# Patient Record
Sex: MALE | Race: WHITE
[De-identification: names, ages, dates, MRNs, and addresses within clinical notes are randomized per-mention and may not be internally consistent; named-entity substitution may affect disease eponyms.]

---

## 2018-05-24 ENCOUNTER — HOSPITAL ENCOUNTER (INPATIENT)
Dept: HOSPITAL 91 - TEL | Age: 68
LOS: 2 days | Discharge: LEFT BEFORE DISCHARGE | DRG: 281 | End: 2018-05-26
Payer: MEDICARE

## 2018-05-24 ENCOUNTER — HOSPITAL ENCOUNTER (INPATIENT)
Age: 68
LOS: 2 days | Discharge: LEFT BEFORE DISCHARGE | DRG: 281 | End: 2018-05-26

## 2018-05-24 DIAGNOSIS — N17.9: ICD-10-CM

## 2018-05-24 DIAGNOSIS — I35.0: ICD-10-CM

## 2018-05-24 DIAGNOSIS — I95.9: ICD-10-CM

## 2018-05-24 DIAGNOSIS — I13.10: ICD-10-CM

## 2018-05-24 DIAGNOSIS — R73.9: ICD-10-CM

## 2018-05-24 DIAGNOSIS — N18.9: ICD-10-CM

## 2018-05-24 DIAGNOSIS — R55: Primary | ICD-10-CM

## 2018-05-24 DIAGNOSIS — I21.A1: ICD-10-CM

## 2018-05-24 LAB
ADD MAN DIFF?: NO
ADD UMIC: YES
ALANINE AMINOTRANSFERASE: 24 IU/L (ref 13–69)
ALBUMIN/GLOBULIN RATIO: 1.15
ALBUMIN: 4.4 G/DL (ref 3.3–4.9)
ALKALINE PHOSPHATASE: 68 IU/L (ref 42–121)
ANION GAP: 20 (ref 8–16)
ASPARTATE AMINO TRANSFERASE: 20 IU/L (ref 15–46)
B-TYPE NATRIURETIC PEPTIDE: 807 PG/ML (ref 0–125)
BASOPHIL #: 0 10^3/UL (ref 0–0.1)
BASOPHILS %: 0.4 % (ref 0–2)
BILIRUBIN,DIRECT: 0 MG/DL (ref 0–0.2)
BILIRUBIN,TOTAL: 0.6 MG/DL (ref 0.2–1.3)
BLOOD UREA NITROGEN: 26 MG/DL (ref 7–20)
CALCIUM: 9.2 MG/DL (ref 8.4–10.2)
CARBON DIOXIDE: 24 MMOL/L (ref 21–31)
CHLORIDE: 100 MMOL/L (ref 97–110)
CK INDEX: 2.6
CK-MB: 1.87 NG/ML (ref 0–2.4)
CREATINE KINASE: 73 IU/L (ref 23–200)
CREATINE KINASE: 75 IU/L (ref 23–200)
CREATININE: 1.51 MG/DL (ref 0.61–1.24)
EOSINOPHILS #: 0.2 10^3/UL (ref 0–0.5)
EOSINOPHILS %: 2.6 % (ref 0–7)
GLOBULIN: 3.8 G/DL (ref 1.3–3.2)
GLUCOSE: 255 MG/DL (ref 70–220)
HEMATOCRIT: 34.8 % (ref 42–52)
HEMOGLOBIN A1C: 5.1 % (ref 0–5.9)
HEMOGLOBIN: 11.4 G/DL (ref 14–18)
INR: 1.08
LYMPHOCYTES #: 2.8 10^3/UL (ref 0.8–2.9)
LYMPHOCYTES %: 36.6 % (ref 15–51)
MEAN CORPUSCULAR HEMOGLOBIN: 28.2 PG (ref 29–33)
MEAN CORPUSCULAR HGB CONC: 32.8 G/DL (ref 32–37)
MEAN CORPUSCULAR VOLUME: 86.1 FL (ref 82–101)
MEAN PLATELET VOLUME: 11 FL (ref 7.4–10.4)
MONOCYTE #: 0.3 10^3/UL (ref 0.3–0.9)
MONOCYTES %: 3.2 % (ref 0–11)
NEUTROPHIL #: 4.4 10^3/UL (ref 1.6–7.5)
NEUTROPHILS %: 56.9 % (ref 39–77)
NUCLEATED RED BLOOD CELLS #: 0 10^3/UL (ref 0–0)
NUCLEATED RED BLOOD CELLS%: 0 /100WBC (ref 0–0)
PLATELET COUNT: 168 10^3/UL (ref 140–415)
POTASSIUM: 3.7 MMOL/L (ref 3.5–5.1)
PROTIME: 14.1 SEC (ref 11.9–14.9)
PT RATIO: 1.1
RED BLOOD COUNT: 4.04 10^6/UL (ref 4.7–6.1)
RED CELL DISTRIBUTION WIDTH: 14.3 % (ref 11.5–14.5)
SODIUM: 140 MMOL/L (ref 135–144)
TOTAL PROTEIN: 8.2 G/DL (ref 6.1–8.1)
TROPONIN-I: 0.13 NG/ML (ref 0–0.12)
TROPONIN-I: < 0.012 NG/ML (ref 0–0.12)
UR ASCORBIC ACID: NEGATIVE MG/DL
UR BILIRUBIN (DIP): NEGATIVE MG/DL
UR BLOOD (DIP): NEGATIVE MG/DL
UR CLARITY: CLEAR
UR COLOR: (no result)
UR GLUCOSE (DIP): (no result) MG/DL
UR KETONES (DIP): NEGATIVE MG/DL
UR LEUKOCYTE ESTERASE (DIP): NEGATIVE LEU/UL
UR NITRITE (DIP): NEGATIVE MG/DL
UR PH (DIP): 8 (ref 5–9)
UR RBC: 0 /HPF (ref 0–5)
UR SPECIFIC GRAVITY (DIP): 1.01 (ref 1–1.03)
UR TOTAL PROTEIN (DIP): (no result) MG/DL
UR UROBILINOGEN (DIP): NEGATIVE MG/DL
UR WBC: 1 /HPF (ref 0–5)
WHITE BLOOD COUNT: 7.7 10^3/UL (ref 4.8–10.8)

## 2018-05-24 PROCEDURE — 84484 ASSAY OF TROPONIN QUANT: CPT

## 2018-05-24 PROCEDURE — 99285 EMERGENCY DEPT VISIT HI MDM: CPT

## 2018-05-24 PROCEDURE — 71045 X-RAY EXAM CHEST 1 VIEW: CPT

## 2018-05-24 PROCEDURE — 93306 TTE W/DOPPLER COMPLETE: CPT

## 2018-05-24 PROCEDURE — 85670 THROMBIN TIME PLASMA: CPT

## 2018-05-24 PROCEDURE — 85610 PROTHROMBIN TIME: CPT

## 2018-05-24 PROCEDURE — 93005 ELECTROCARDIOGRAM TRACING: CPT

## 2018-05-24 PROCEDURE — 80048 BASIC METABOLIC PNL TOTAL CA: CPT

## 2018-05-24 PROCEDURE — 82962 GLUCOSE BLOOD TEST: CPT

## 2018-05-24 PROCEDURE — 82553 CREATINE MB FRACTION: CPT

## 2018-05-24 PROCEDURE — 83036 HEMOGLOBIN GLYCOSYLATED A1C: CPT

## 2018-05-24 PROCEDURE — 80061 LIPID PANEL: CPT

## 2018-05-24 PROCEDURE — 36415 COLL VENOUS BLD VENIPUNCTURE: CPT

## 2018-05-24 PROCEDURE — 83735 ASSAY OF MAGNESIUM: CPT

## 2018-05-24 PROCEDURE — 85730 THROMBOPLASTIN TIME PARTIAL: CPT

## 2018-05-24 PROCEDURE — 80053 COMPREHEN METABOLIC PANEL: CPT

## 2018-05-24 PROCEDURE — 81001 URINALYSIS AUTO W/SCOPE: CPT

## 2018-05-24 PROCEDURE — 85025 COMPLETE CBC W/AUTO DIFF WBC: CPT

## 2018-05-24 PROCEDURE — 96374 THER/PROPH/DIAG INJ IV PUSH: CPT

## 2018-05-24 PROCEDURE — 82550 ASSAY OF CK (CPK): CPT

## 2018-05-24 PROCEDURE — 83880 ASSAY OF NATRIURETIC PEPTIDE: CPT

## 2018-05-24 PROCEDURE — 85049 AUTOMATED PLATELET COUNT: CPT

## 2018-05-24 RX ADMIN — THIAMINE HYDROCHLORIDE 1 MLS/HR: 100 INJECTION, SOLUTION INTRAMUSCULAR; INTRAVENOUS at 11:03

## 2018-05-24 RX ADMIN — INSULIN ASPART 1 UNIT: 100 INJECTION, SOLUTION INTRAVENOUS; SUBCUTANEOUS at 16:56

## 2018-05-24 RX ADMIN — INSULIN ASPART 1 UNIT: 100 INJECTION, SOLUTION INTRAVENOUS; SUBCUTANEOUS at 21:00

## 2018-05-24 RX ADMIN — ONDANSETRON HYDROCHLORIDE 1 MG: 2 INJECTION, SOLUTION INTRAMUSCULAR; INTRAVENOUS at 11:03

## 2018-05-24 RX ADMIN — THIAMINE HYDROCHLORIDE 1 MLS/HR: 100 INJECTION, SOLUTION INTRAMUSCULAR; INTRAVENOUS at 15:19

## 2018-05-24 RX ADMIN — ATORVASTATIN CALCIUM 1 MG: 20 TABLET, FILM COATED ORAL at 21:03

## 2018-05-25 LAB
ADD MAN DIFF?: NO
ADD MAN DIFF?: NO
ALANINE AMINOTRANSFERASE: 26 IU/L (ref 13–69)
ALBUMIN/GLOBULIN RATIO: 1.09
ALBUMIN: 3.6 G/DL (ref 3.3–4.9)
ALKALINE PHOSPHATASE: 53 IU/L (ref 42–121)
ANION GAP: 14 (ref 8–16)
ASPARTATE AMINO TRANSFERASE: 21 IU/L (ref 15–46)
BASOPHIL #: 0 10^3/UL (ref 0–0.1)
BASOPHIL #: 0 10^3/UL (ref 0–0.1)
BASOPHILS %: 0.4 % (ref 0–2)
BASOPHILS %: 0.5 % (ref 0–2)
BILIRUBIN,DIRECT: 0 MG/DL (ref 0–0.2)
BILIRUBIN,TOTAL: 0.3 MG/DL (ref 0.2–1.3)
BLOOD UREA NITROGEN: 22 MG/DL (ref 7–20)
CALCIUM: 8.9 MG/DL (ref 8.4–10.2)
CARBON DIOXIDE: 29 MMOL/L (ref 21–31)
CHLORIDE: 106 MMOL/L (ref 97–110)
CK INDEX: 2.7
CK-MB: 2.03 NG/ML (ref 0–2.4)
CREATININE: 1.3 MG/DL (ref 0.61–1.24)
EOSINOPHILS #: 0.2 10^3/UL (ref 0–0.5)
EOSINOPHILS #: 0.3 10^3/UL (ref 0–0.5)
EOSINOPHILS %: 2.9 % (ref 0–7)
EOSINOPHILS %: 3.5 % (ref 0–7)
GLOBULIN: 3.3 G/DL (ref 1.3–3.2)
GLUCOSE: 102 MG/DL (ref 70–220)
HEMATOCRIT: 32.4 % (ref 42–52)
HEMATOCRIT: 34.6 % (ref 42–52)
HEMOGLOBIN: 10.4 G/DL (ref 14–18)
HEMOGLOBIN: 11.1 G/DL (ref 14–18)
INR: 0.99
LYMPHOCYTES #: 2.4 10^3/UL (ref 0.8–2.9)
LYMPHOCYTES #: 2.6 10^3/UL (ref 0.8–2.9)
LYMPHOCYTES %: 31.9 % (ref 15–51)
LYMPHOCYTES %: 33 % (ref 15–51)
MAGNESIUM: 2.2 MG/DL (ref 1.7–2.5)
MEAN CORPUSCULAR HEMOGLOBIN: 28 PG (ref 29–33)
MEAN CORPUSCULAR HEMOGLOBIN: 28 PG (ref 29–33)
MEAN CORPUSCULAR HGB CONC: 32.1 G/DL (ref 32–37)
MEAN CORPUSCULAR HGB CONC: 32.1 G/DL (ref 32–37)
MEAN CORPUSCULAR VOLUME: 87.3 FL (ref 82–101)
MEAN CORPUSCULAR VOLUME: 87.4 FL (ref 82–101)
MEAN PLATELET VOLUME: 10.4 FL (ref 7.4–10.4)
MEAN PLATELET VOLUME: 10.9 FL (ref 7.4–10.4)
MONOCYTE #: 0.3 10^3/UL (ref 0.3–0.9)
MONOCYTE #: 0.4 10^3/UL (ref 0.3–0.9)
MONOCYTES %: 3.9 % (ref 0–11)
MONOCYTES %: 4.9 % (ref 0–11)
NEUTROPHIL #: 4.6 10^3/UL (ref 1.6–7.5)
NEUTROPHIL #: 4.6 10^3/UL (ref 1.6–7.5)
NEUTROPHILS %: 57.8 % (ref 39–77)
NEUTROPHILS %: 60.8 % (ref 39–77)
NUCLEATED RED BLOOD CELLS #: 0 10^3/UL (ref 0–0)
NUCLEATED RED BLOOD CELLS #: 0 10^3/UL (ref 0–0)
NUCLEATED RED BLOOD CELLS%: 0 /100WBC (ref 0–0)
NUCLEATED RED BLOOD CELLS%: 0 /100WBC (ref 0–0)
PARTIAL THROMBOPLASTIN TIME: 42.1 SEC (ref 25–35)
PARTIAL THROMBOPLASTIN TIME: 44.5 SEC (ref 25–35)
PLATELET COUNT: 178 10^3/UL (ref 140–415)
PLATELET COUNT: 182 10^3/UL (ref 140–415)
POTASSIUM: 3.6 MMOL/L (ref 3.5–5.1)
PROTIME: 13.2 SEC (ref 11.9–14.9)
PT RATIO: 1
RED BLOOD COUNT: 3.71 10^6/UL (ref 4.7–6.1)
RED BLOOD COUNT: 3.96 10^6/UL (ref 4.7–6.1)
RED CELL DISTRIBUTION WIDTH: 14.5 % (ref 11.5–14.5)
RED CELL DISTRIBUTION WIDTH: 14.6 % (ref 11.5–14.5)
SODIUM: 145 MMOL/L (ref 135–144)
TOTAL PROTEIN: 6.9 G/DL (ref 6.1–8.1)
TROPONIN-I: 0.16 NG/ML (ref 0–0.12)
TROPONIN-I: 0.21 NG/ML (ref 0–0.12)
TROPONIN-I: 0.29 NG/ML (ref 0–0.12)
WHITE BLOOD COUNT: 7.6 10^3/UL (ref 4.8–10.8)
WHITE BLOOD COUNT: 8 10^3/UL (ref 4.8–10.8)

## 2018-05-25 RX ADMIN — HEPARIN SODIUM AND DEXTROSE 1 MLS/HR: 10000; 5 INJECTION INTRAVENOUS at 17:26

## 2018-05-25 RX ADMIN — ATORVASTATIN CALCIUM 1 MG: 20 TABLET, FILM COATED ORAL at 21:21

## 2018-05-25 RX ADMIN — INSULIN ASPART 1 UNIT: 100 INJECTION, SOLUTION INTRAVENOUS; SUBCUTANEOUS at 21:00

## 2018-05-25 RX ADMIN — THIAMINE HYDROCHLORIDE 1 MLS/HR: 100 INJECTION, SOLUTION INTRAMUSCULAR; INTRAVENOUS at 11:00

## 2018-05-25 RX ADMIN — INSULIN ASPART 1 UNIT: 100 INJECTION, SOLUTION INTRAVENOUS; SUBCUTANEOUS at 07:35

## 2018-05-25 RX ADMIN — VALSARTAN 1 MG: 160 TABLET, FILM COATED ORAL at 09:38

## 2018-05-25 RX ADMIN — INSULIN ASPART 1 UNIT: 100 INJECTION, SOLUTION INTRAVENOUS; SUBCUTANEOUS at 17:00

## 2018-05-25 RX ADMIN — METOPROLOL TARTRATE 1 MG: 25 TABLET ORAL at 21:22

## 2018-05-25 RX ADMIN — HEPARIN SODIUM 1 UNIT: 1000 INJECTION, SOLUTION INTRAVENOUS; SUBCUTANEOUS at 09:41

## 2018-05-25 RX ADMIN — INSULIN ASPART 1 UNIT: 100 INJECTION, SOLUTION INTRAVENOUS; SUBCUTANEOUS at 11:45

## 2018-05-25 RX ADMIN — ASPIRIN 325 MG ORAL TABLET 1 MG: 325 PILL ORAL at 09:38

## 2018-05-25 RX ADMIN — HEPARIN SODIUM 1 UNIT: 1000 INJECTION, SOLUTION INTRAVENOUS; SUBCUTANEOUS at 17:24

## 2018-05-25 RX ADMIN — HEPARIN SODIUM AND DEXTROSE 1 MLS/HR: 10000; 5 INJECTION INTRAVENOUS at 10:53

## 2018-05-25 RX ADMIN — AMLODIPINE BESYLATE 1 MG: 10 TABLET ORAL at 09:39

## 2018-05-25 RX ADMIN — METOPROLOL SUCCINATE 1 MG: 25 TABLET, EXTENDED RELEASE ORAL at 09:39

## 2018-05-26 LAB
ADD MAN DIFF?: NO
ANION GAP: 14 (ref 8–16)
BASOPHIL #: 0 10^3/UL (ref 0–0.1)
BASOPHILS %: 0.5 % (ref 0–2)
BLOOD UREA NITROGEN: 20 MG/DL (ref 7–20)
CALCIUM: 8.7 MG/DL (ref 8.4–10.2)
CARBON DIOXIDE: 27 MMOL/L (ref 21–31)
CHLORIDE: 107 MMOL/L (ref 97–110)
CHOL/HDL RATIO: 7.3 RATIO
CHOLESTEROL: 184 MG/DL (ref 100–200)
CREATININE: 1.23 MG/DL (ref 0.61–1.24)
EOSINOPHILS #: 0.2 10^3/UL (ref 0–0.5)
EOSINOPHILS %: 2.9 % (ref 0–7)
GLUCOSE: 106 MG/DL (ref 70–220)
HDL CHOLESTEROL: 25 MG/DL (ref 30–78)
HEMATOCRIT: 35.3 % (ref 42–52)
HEMOGLOBIN: 11.4 G/DL (ref 14–18)
INR: 1.03
LDL CHOLESTEROL,CALCULATED: 111 MG/DL
LYMPHOCYTES #: 2.3 10^3/UL (ref 0.8–2.9)
LYMPHOCYTES %: 28.9 % (ref 15–51)
MEAN CORPUSCULAR HEMOGLOBIN: 28.4 PG (ref 29–33)
MEAN CORPUSCULAR HGB CONC: 32.3 G/DL (ref 32–37)
MEAN CORPUSCULAR VOLUME: 87.8 FL (ref 82–101)
MEAN PLATELET VOLUME: 10.3 FL (ref 7.4–10.4)
MONOCYTE #: 0.5 10^3/UL (ref 0.3–0.9)
MONOCYTES %: 5.9 % (ref 0–11)
NEUTROPHIL #: 4.9 10^3/UL (ref 1.6–7.5)
NEUTROPHILS %: 61.5 % (ref 39–77)
NUCLEATED RED BLOOD CELLS #: 0 10^3/UL (ref 0–0)
NUCLEATED RED BLOOD CELLS%: 0 /100WBC (ref 0–0)
PARTIAL THROMBOPLASTIN TIME: 49.3 SEC (ref 25–35)
PARTIAL THROMBOPLASTIN TIME: 67 SEC (ref 25–35)
PARTIAL THROMBOPLASTIN TIME: 96 SEC (ref 25–35)
PLATELET COUNT: 185 10^3/UL (ref 140–415)
PLATELET COUNT: 205 10^3/UL (ref 140–415)
POTASSIUM: 3.8 MMOL/L (ref 3.5–5.1)
PROTIME: 13.6 SEC (ref 11.9–14.9)
PT RATIO: 1.1
RED BLOOD COUNT: 4.02 10^6/UL (ref 4.7–6.1)
RED CELL DISTRIBUTION WIDTH: 14.1 % (ref 11.5–14.5)
SODIUM: 144 MMOL/L (ref 135–144)
THROMBIN TIME: 20.4 SEC (ref 13.8–19.1)
TRIGLYCERIDES: 241 MG/DL (ref 0–149)
WHITE BLOOD COUNT: 8 10^3/UL (ref 4.8–10.8)

## 2018-05-26 RX ADMIN — INSULIN ASPART 1 UNIT: 100 INJECTION, SOLUTION INTRAVENOUS; SUBCUTANEOUS at 21:00

## 2018-05-26 RX ADMIN — INSULIN ASPART 1 UNIT: 100 INJECTION, SOLUTION INTRAVENOUS; SUBCUTANEOUS at 11:50

## 2018-05-26 RX ADMIN — HYDRALAZINE HYDROCHLORIDE 1 MG: 20 INJECTION INTRAMUSCULAR; INTRAVENOUS at 00:24

## 2018-05-26 RX ADMIN — HEPARIN SODIUM AND DEXTROSE 1 MLS/HR: 10000; 5 INJECTION INTRAVENOUS at 08:25

## 2018-05-26 RX ADMIN — INSULIN ASPART 1 UNIT: 100 INJECTION, SOLUTION INTRAVENOUS; SUBCUTANEOUS at 17:17

## 2018-05-26 RX ADMIN — HEPARIN SODIUM AND DEXTROSE 1 MLS/HR: 10000; 5 INJECTION INTRAVENOUS at 12:52

## 2018-05-26 RX ADMIN — INSULIN ASPART 1 UNIT: 100 INJECTION, SOLUTION INTRAVENOUS; SUBCUTANEOUS at 07:55

## 2018-05-26 RX ADMIN — METOPROLOL TARTRATE 1 MG: 25 TABLET ORAL at 21:06

## 2018-05-26 RX ADMIN — ATORVASTATIN CALCIUM 1 MG: 20 TABLET, FILM COATED ORAL at 21:05

## 2018-05-26 RX ADMIN — HEPARIN SODIUM AND DEXTROSE 1 MLS/HR: 10000; 5 INJECTION INTRAVENOUS at 00:41

## 2018-05-26 RX ADMIN — METOPROLOL TARTRATE 1 MG: 25 TABLET ORAL at 08:15

## 2018-05-26 RX ADMIN — ASPIRIN 325 MG ORAL TABLET 1 MG: 325 PILL ORAL at 08:14

## 2018-05-26 RX ADMIN — AMLODIPINE BESYLATE 1 MG: 10 TABLET ORAL at 08:14

## 2019-12-31 ENCOUNTER — HOSPITAL ENCOUNTER (INPATIENT)
Dept: HOSPITAL 54 - ER | Age: 69
LOS: 4 days | Discharge: HOME HEALTH | DRG: 871 | End: 2020-01-04
Attending: INTERNAL MEDICINE | Admitting: INTERNAL MEDICINE
Payer: MEDICARE

## 2019-12-31 VITALS — WEIGHT: 187 LBS | BODY MASS INDEX: 27.7 KG/M2 | HEIGHT: 69 IN

## 2019-12-31 VITALS — SYSTOLIC BLOOD PRESSURE: 109 MMHG | DIASTOLIC BLOOD PRESSURE: 56 MMHG

## 2019-12-31 VITALS — SYSTOLIC BLOOD PRESSURE: 119 MMHG | DIASTOLIC BLOOD PRESSURE: 68 MMHG

## 2019-12-31 DIAGNOSIS — L03.116: ICD-10-CM

## 2019-12-31 DIAGNOSIS — D61.818: ICD-10-CM

## 2019-12-31 DIAGNOSIS — E43: ICD-10-CM

## 2019-12-31 DIAGNOSIS — I25.10: ICD-10-CM

## 2019-12-31 DIAGNOSIS — A41.9: Primary | ICD-10-CM

## 2019-12-31 DIAGNOSIS — I21.A1: ICD-10-CM

## 2019-12-31 DIAGNOSIS — D69.59: ICD-10-CM

## 2019-12-31 DIAGNOSIS — E87.1: ICD-10-CM

## 2019-12-31 DIAGNOSIS — Z82.49: ICD-10-CM

## 2019-12-31 DIAGNOSIS — E80.6: ICD-10-CM

## 2019-12-31 DIAGNOSIS — Z79.82: ICD-10-CM

## 2019-12-31 DIAGNOSIS — E86.1: ICD-10-CM

## 2019-12-31 DIAGNOSIS — E87.6: ICD-10-CM

## 2019-12-31 DIAGNOSIS — K76.0: ICD-10-CM

## 2019-12-31 DIAGNOSIS — Z95.2: ICD-10-CM

## 2019-12-31 DIAGNOSIS — R16.2: ICD-10-CM

## 2019-12-31 DIAGNOSIS — I10: ICD-10-CM

## 2019-12-31 DIAGNOSIS — R65.20: ICD-10-CM

## 2019-12-31 DIAGNOSIS — K52.9: ICD-10-CM

## 2019-12-31 DIAGNOSIS — Z95.5: ICD-10-CM

## 2019-12-31 DIAGNOSIS — B95.61: ICD-10-CM

## 2019-12-31 DIAGNOSIS — K44.9: ICD-10-CM

## 2019-12-31 DIAGNOSIS — N17.0: ICD-10-CM

## 2019-12-31 DIAGNOSIS — E88.09: ICD-10-CM

## 2019-12-31 DIAGNOSIS — J18.9: ICD-10-CM

## 2019-12-31 LAB
ALBUMIN SERPL BCP-MCNC: 2.6 G/DL (ref 3.4–5)
ALP SERPL-CCNC: 86 U/L (ref 46–116)
ALT SERPL W P-5'-P-CCNC: 62 U/L (ref 12–78)
APPEARANCE UR: (no result)
AST SERPL W P-5'-P-CCNC: 61 U/L (ref 15–37)
BASOPHILS # BLD AUTO: 0 /CMM (ref 0–0.2)
BASOPHILS NFR BLD AUTO: 1.3 % (ref 0–2)
BILIRUB DIRECT SERPL-MCNC: 1.2 MG/DL (ref 0–0.2)
BILIRUB SERPL-MCNC: 1.8 MG/DL (ref 0.2–1)
BILIRUB UR QL STRIP: (no result)
BUN SERPL-MCNC: 45 MG/DL (ref 7–18)
CALCIUM SERPL-MCNC: 8.5 MG/DL (ref 8.5–10.1)
CHLORIDE SERPL-SCNC: 94 MMOL/L (ref 98–107)
CO2 SERPL-SCNC: 24 MMOL/L (ref 21–32)
COLOR UR: (no result)
CREAT SERPL-MCNC: 2.4 MG/DL (ref 0.6–1.3)
EOSINOPHIL NFR BLD AUTO: 0.2 % (ref 0–6)
GLUCOSE SERPL-MCNC: 122 MG/DL (ref 74–106)
HCT VFR BLD AUTO: 35 % (ref 39–51)
HGB BLD-MCNC: 11.7 G/DL (ref 13.5–17.5)
LYMPHOCYTES NFR BLD AUTO: 0.6 /CMM (ref 0.8–4.8)
LYMPHOCYTES NFR BLD AUTO: 19.1 % (ref 20–44)
LYMPHOCYTES NFR BLD MANUAL: 15 % (ref 16–48)
MCHC RBC AUTO-ENTMCNC: 33 G/DL (ref 31–36)
MCV RBC AUTO: 84 FL (ref 80–96)
MONOCYTES NFR BLD AUTO: 0.2 /CMM (ref 0.1–1.3)
MONOCYTES NFR BLD AUTO: 5.3 % (ref 2–12)
MONOCYTES NFR BLD MANUAL: 5 % (ref 0–11)
NEUTROPHILS # BLD AUTO: 2.4 /CMM (ref 1.8–8.9)
NEUTROPHILS NFR BLD AUTO: 74.1 % (ref 43–81)
NEUTS SEG NFR BLD MANUAL: 80 % (ref 42–76)
PH UR STRIP: 5.5 [PH] (ref 5–8)
PLATELET # BLD AUTO: 56 /CMM (ref 150–450)
POTASSIUM SERPL-SCNC: 3.4 MMOL/L (ref 3.5–5.1)
PROT SERPL-MCNC: 6.8 G/DL (ref 6.4–8.2)
PROT UR QL STRIP: >=300 MG/DL
RBC # BLD AUTO: 4.17 MIL/UL (ref 4.5–6)
RBC #/AREA URNS HPF: (no result) /HPF (ref 0–2)
SODIUM SERPL-SCNC: 131 MMOL/L (ref 136–145)
UROBILINOGEN UR STRIP-MCNC: 1 EU/DL
WBC #/AREA URNS HPF: (no result) /HPF (ref 0–3)
WBC NRBC COR # BLD AUTO: 3.2 K/UL (ref 4.3–11)

## 2019-12-31 PROCEDURE — G0378 HOSPITAL OBSERVATION PER HR: HCPCS

## 2019-12-31 PROCEDURE — C1751 CATH, INF, PER/CENT/MIDLINE: HCPCS

## 2019-12-31 RX ADMIN — Medication SCH MG: at 16:50

## 2019-12-31 RX ADMIN — SODIUM CHLORIDE SCH MLS/HR: 9 INJECTION, SOLUTION INTRAVENOUS at 16:49

## 2019-12-31 RX ADMIN — ACETAMINOPHEN PRN MG: 325 TABLET ORAL at 23:57

## 2019-12-31 RX ADMIN — SIMVASTATIN SCH MG: 40 TABLET, FILM COATED ORAL at 22:56

## 2019-12-31 NOTE — NUR
TELE/RN OPENING NOTES

RECEIVED PATIENT SITTING IN BED, FAMILY AT BED SIDE ON OXYGEN FOR COMFORT MEASURES, ABLE TO 
AMBULATE WITH ASSISTANCE, RECEIVED ENDORSEMENT FROM AM RN FOR KERI. BED LOCKED, CALL LIGHTS 
WITHIN REACH. TELE MONITOR AWITH SR TO ST . SKIN WARM TO TOUCH, REFUSE SKIN ASSESSMENT 
REPORTED INTASCT WITH NO OPEN WOUNDS, MONITORING FOR ELEVATED TEMPERATURE, WILL MONITOR. ON 
CARDIAC DIET, ABLE TO TOLERATE FLUIDS. IV SITE ON RIGHT AC PATENT. WILL MONITOR.

## 2019-12-31 NOTE — NUR
TELE/RN NOTES VANCOMYCIN 1.25MG IV ADMINISTERED VIA IV PER MD ORDER. PATIENT TOLERATED , IV 
SITE ON RIGHT AC PATENT.

## 2019-12-31 NOTE — NUR
MS RN NOTE:



INFORMED PATIENT ABOUT SKIN ASSESSMENT TO BE DONE AND EXPLAINED THE NEED.  PATIENT AND 
FAMILY INFORMED NURSE THAT PATIENT IS FREE OF SKIN ISSUES AND SKIN IS INTACT EXCEPT FOR THE 
LEFT SHIN ABRASION. REFUSED HEAD TO TOE SKIN ASSESSMENT.

## 2019-12-31 NOTE — NUR
TELE RN ADMISSION NOTE:



PATIENT ARRIVED IN UNIT VIA GURNEY WITH ED STAFF AND FAMILY. PATIENT IS INDEPENDENT WITH 
AMBULATION. ON ROOM AIR AND TOLERATING WELL, NO SOB AND NOT IN RESPIRATORY DISTRESS. 
SATURATION OF 95%, OFFERED O2 VIA NC @ 2LPM AND PATIENT ACCEPTED. TOLERATED WELL WITH 
SATURATION @ 99%. ALERT AND ORIENTED X4. Turkish-SPEAKING AND UNDERSTANDS ENGLISH FAIRLY 
WELL WITH SONS ACTING AS . IV SITE ON RAC G20 SALINE LOCK, CLEAN, DRY AND PATENT. 
NO PAIN NO DISCOMFORT NOTED. ON CARDIAC MONITORING WITH SINUS TACHYCARDIA @ 101BPM. FEVER 
NOTED .3. COOLING MEASURES STARTED. ORIENTED TO ROOM SET-UP. CALL LIGHT IN REACH, SIDE 
RAILS UP, BED LOCKED, LOW AND AT SEMI-EAGLE'S POSITION. WILL CONTINUE TO MONITOR. AWAITING 
ORDERS FROM DR. VIRK.

## 2019-12-31 NOTE — NUR
TELE RN CLOSING NOTE:



PATIENT IN BED. AWAKE, ALERT AND ORIENTED X4. ON O2 VIA NC @ 2LPM AND PATIENT TOLERATING 
WELL WITH SATURATION @ 99%.  IV SITE ON RAC G20 SALINE LOCK, CLEAN, DRY AND PATENT WITH IV 
INFUSION OF NS @ 100MLS/HR INFUSING WELL. NO PAIN NO DISCOMFORT NOTED. ON CARDIAC MONITORING 
WITH SINUS TACHYCARDIA @ 105BPM. FEVER NOTED UPON ADMISSION AND MD AWARE, COOLING MEASURES 
IN PLACE. CALL LIGHT IN REACH, SIDE RAILS UP, BED LOCKED, LOW AND AT SEMI-EAGLE'S POSITION. 
DR. VIRK MADE ROUNDS WITH PATIENT EARLIER AND IS AWARE OF PATIENT'S CONDITION. 
ENDORSED CARE TO NIGHT SHIFT.

## 2020-01-01 VITALS — DIASTOLIC BLOOD PRESSURE: 61 MMHG | SYSTOLIC BLOOD PRESSURE: 121 MMHG

## 2020-01-01 VITALS — DIASTOLIC BLOOD PRESSURE: 69 MMHG | SYSTOLIC BLOOD PRESSURE: 132 MMHG

## 2020-01-01 VITALS — SYSTOLIC BLOOD PRESSURE: 125 MMHG | DIASTOLIC BLOOD PRESSURE: 64 MMHG

## 2020-01-01 VITALS — DIASTOLIC BLOOD PRESSURE: 54 MMHG | SYSTOLIC BLOOD PRESSURE: 106 MMHG

## 2020-01-01 VITALS — SYSTOLIC BLOOD PRESSURE: 118 MMHG | DIASTOLIC BLOOD PRESSURE: 57 MMHG

## 2020-01-01 VITALS — DIASTOLIC BLOOD PRESSURE: 66 MMHG | SYSTOLIC BLOOD PRESSURE: 129 MMHG

## 2020-01-01 LAB
APPEARANCE UR: CLEAR
BASOPHILS # BLD AUTO: 0 /CMM (ref 0–0.2)
BASOPHILS NFR BLD AUTO: 0.8 % (ref 0–2)
BILIRUB UR QL STRIP: NEGATIVE
BUN SERPL-MCNC: 45 MG/DL (ref 7–18)
CALCIUM SERPL-MCNC: 7.6 MG/DL (ref 8.5–10.1)
CHLORIDE SERPL-SCNC: 98 MMOL/L (ref 98–107)
CHOLEST SERPL-MCNC: 171 MG/DL (ref ?–200)
CO2 SERPL-SCNC: 24 MMOL/L (ref 21–32)
COLOR UR: (no result)
CREAT SERPL-MCNC: 2.2 MG/DL (ref 0.6–1.3)
CREAT UR-MCNC: 118 MG/DL (ref 30–125)
EOSINOPHIL NFR BLD AUTO: 0.6 % (ref 0–6)
GLUCOSE SERPL-MCNC: 123 MG/DL (ref 74–106)
GLUCOSE UR STRIP-MCNC: NEGATIVE MG/DL
HCT VFR BLD AUTO: 32 % (ref 39–51)
HDLC SERPL-MCNC: 10 MG/DL (ref 40–60)
HGB BLD-MCNC: 10.4 G/DL (ref 13.5–17.5)
HGB UR QL STRIP: (no result) ERY/UL
KETONES UR STRIP-MCNC: NEGATIVE MG/DL
LDLC SERPL DIRECT ASSAY-MCNC: 23 MG/DL (ref 0–99)
LEUKOCYTE ESTERASE UR QL STRIP: NEGATIVE
LYMPHOCYTES NFR BLD AUTO: 0.9 /CMM (ref 0.8–4.8)
LYMPHOCYTES NFR BLD AUTO: 26 % (ref 20–44)
LYMPHOCYTES NFR BLD MANUAL: 18 % (ref 16–48)
MAGNESIUM SERPL-MCNC: 2.3 MG/DL (ref 1.8–2.4)
MCHC RBC AUTO-ENTMCNC: 33 G/DL (ref 31–36)
MCV RBC AUTO: 84 FL (ref 80–96)
MONOCYTES NFR BLD AUTO: 0.4 /CMM (ref 0.1–1.3)
MONOCYTES NFR BLD AUTO: 12.3 % (ref 2–12)
MONOCYTES NFR BLD MANUAL: 10 % (ref 0–11)
NEUTROPHILS # BLD AUTO: 2.2 /CMM (ref 1.8–8.9)
NEUTROPHILS NFR BLD AUTO: 60.3 % (ref 43–81)
NEUTS BAND NFR BLD MANUAL: 8 % (ref 0–5)
NEUTS SEG NFR BLD MANUAL: 64 % (ref 42–76)
NITRITE UR QL STRIP: NEGATIVE
PH UR STRIP: 6 [PH] (ref 5–8)
PHOSPHATE SERPL-MCNC: 2.9 MG/DL (ref 2.5–4.9)
PLATELET # BLD AUTO: 36 /CMM (ref 150–450)
POTASSIUM SERPL-SCNC: 3.2 MMOL/L (ref 3.5–5.1)
PROT UR QL STRIP: 30 MG/DL
PROT UR-MCNC: 86.5 MG/DL (ref 0–11.9)
RBC # BLD AUTO: 3.77 MIL/UL (ref 4.5–6)
RBC #/AREA URNS HPF: (no result) /HPF (ref 0–2)
SODIUM SERPL-SCNC: 133 MMOL/L (ref 136–145)
SODIUM UR-SCNC: 7 MMOL/L (ref 40–220)
TRIGL SERPL-MCNC: 853 MG/DL (ref 30–150)
UROBILINOGEN UR STRIP-MCNC: 0.2 EU/DL
WBC #/AREA URNS HPF: (no result) /HPF (ref 0–3)
WBC NRBC COR # BLD AUTO: 3.6 K/UL (ref 4.3–11)

## 2020-01-01 RX ADMIN — SODIUM CHLORIDE SCH MLS/HR: 9 INJECTION, SOLUTION INTRAVENOUS at 03:21

## 2020-01-01 RX ADMIN — POTASSIUM CHLORIDE SCH MLS/HR: 200 INJECTION, SOLUTION INTRAVENOUS at 09:47

## 2020-01-01 RX ADMIN — ACETAMINOPHEN PRN MG: 325 TABLET ORAL at 11:03

## 2020-01-01 RX ADMIN — ACETAMINOPHEN PRN MG: 325 TABLET ORAL at 21:52

## 2020-01-01 RX ADMIN — Medication SCH MG: at 08:36

## 2020-01-01 RX ADMIN — SODIUM CHLORIDE PRN MLS/HR: 9 INJECTION, SOLUTION INTRAVENOUS at 09:50

## 2020-01-01 RX ADMIN — MEROPENEM SCH MLS/HR: 1 INJECTION INTRAVENOUS at 09:03

## 2020-01-01 RX ADMIN — DEXTROSE MONOHYDRATE PRN MG: 50 INJECTION, SOLUTION INTRAVENOUS at 19:26

## 2020-01-01 RX ADMIN — Medication SCH MG: at 16:38

## 2020-01-01 RX ADMIN — SODIUM CHLORIDE PRN MLS/HR: 9 INJECTION, SOLUTION INTRAVENOUS at 17:59

## 2020-01-01 RX ADMIN — POTASSIUM CHLORIDE SCH MLS/HR: 200 INJECTION, SOLUTION INTRAVENOUS at 10:56

## 2020-01-01 RX ADMIN — OSELTAMIVIR PHOSPHATE SCH MG: 75 CAPSULE ORAL at 17:59

## 2020-01-01 RX ADMIN — METOPROLOL SUCCINATE SCH MG: 50 TABLET, EXTENDED RELEASE ORAL at 08:37

## 2020-01-01 RX ADMIN — POTASSIUM CHLORIDE SCH MLS/HR: 200 INJECTION, SOLUTION INTRAVENOUS at 08:36

## 2020-01-01 RX ADMIN — DEXTROSE MONOHYDRATE SCH MLS/HR: 50 INJECTION, SOLUTION INTRAVENOUS at 13:27

## 2020-01-01 RX ADMIN — POTASSIUM CHLORIDE SCH MLS/HR: 200 INJECTION, SOLUTION INTRAVENOUS at 12:08

## 2020-01-01 RX ADMIN — FAMOTIDINE SCH MG: 20 TABLET, FILM COATED ORAL at 08:36

## 2020-01-01 RX ADMIN — MEROPENEM SCH MLS/HR: 1 INJECTION INTRAVENOUS at 21:51

## 2020-01-01 RX ADMIN — SIMVASTATIN SCH MG: 40 TABLET, FILM COATED ORAL at 22:43

## 2020-01-01 NOTE — NUR
TELE/RN CLOSING NOTES

PATIENT IN BED, ABLE TO REPOSITION SELF WITH ASSISTANCE, KEPT COMFORTABLE, ATTENDED TO ALL 
NEEDS, IV SITE PATENT WITH NO S/S OF INFILTRATION, MONITORED FOR ANY CHANGES , KEPT SKIN 
INTACT. RESPIRATIONS EVEN AND UNLABORED WITH 2 LITER OXYGEN. BED LOCKED, CALL LIGHTS WITHIN 
REACH, WILL MONITOR. WILL ENDORSE TO AM RN FOR KERI.

## 2020-01-02 VITALS — DIASTOLIC BLOOD PRESSURE: 72 MMHG | SYSTOLIC BLOOD PRESSURE: 124 MMHG

## 2020-01-02 VITALS — SYSTOLIC BLOOD PRESSURE: 124 MMHG | DIASTOLIC BLOOD PRESSURE: 69 MMHG

## 2020-01-02 VITALS — DIASTOLIC BLOOD PRESSURE: 73 MMHG | SYSTOLIC BLOOD PRESSURE: 146 MMHG

## 2020-01-02 VITALS — SYSTOLIC BLOOD PRESSURE: 117 MMHG | DIASTOLIC BLOOD PRESSURE: 64 MMHG

## 2020-01-02 LAB
ALBUMIN SERPL BCP-MCNC: 2 G/DL (ref 3.4–5)
ALP SERPL-CCNC: 89 U/L (ref 46–116)
ALT SERPL W P-5'-P-CCNC: 100 U/L (ref 12–78)
AST SERPL W P-5'-P-CCNC: 98 U/L (ref 15–37)
BASOPHILS # BLD AUTO: 0 /CMM (ref 0–0.2)
BASOPHILS NFR BLD AUTO: 0.6 % (ref 0–2)
BILIRUB SERPL-MCNC: 1 MG/DL (ref 0.2–1)
BUN SERPL-MCNC: 30 MG/DL (ref 7–18)
BUN SERPL-MCNC: 38 MG/DL (ref 7–18)
CALCIUM SERPL-MCNC: 7.4 MG/DL (ref 8.5–10.1)
CALCIUM SERPL-MCNC: 7.8 MG/DL (ref 8.5–10.1)
CHLORIDE SERPL-SCNC: 101 MMOL/L (ref 98–107)
CHLORIDE SERPL-SCNC: 102 MMOL/L (ref 98–107)
CK SERPL-CCNC: 125 U/L (ref 39–308)
CO2 SERPL-SCNC: 23 MMOL/L (ref 21–32)
CO2 SERPL-SCNC: 24 MMOL/L (ref 21–32)
CREAT SERPL-MCNC: 1.4 MG/DL (ref 0.6–1.3)
CREAT SERPL-MCNC: 1.6 MG/DL (ref 0.6–1.3)
EOSINOPHIL NFR BLD AUTO: 0.8 % (ref 0–6)
GLUCOSE SERPL-MCNC: 145 MG/DL (ref 74–106)
GLUCOSE SERPL-MCNC: 161 MG/DL (ref 74–106)
HCT VFR BLD AUTO: 31 % (ref 39–51)
HGB BLD-MCNC: 10.5 G/DL (ref 13.5–17.5)
LYMPHOCYTES NFR BLD AUTO: 1.3 /CMM (ref 0.8–4.8)
LYMPHOCYTES NFR BLD AUTO: 25.6 % (ref 20–44)
MAGNESIUM SERPL-MCNC: 2.3 MG/DL (ref 1.8–2.4)
MCHC RBC AUTO-ENTMCNC: 33 G/DL (ref 31–36)
MCV RBC AUTO: 84 FL (ref 80–96)
MONOCYTES NFR BLD AUTO: 0.6 /CMM (ref 0.1–1.3)
MONOCYTES NFR BLD AUTO: 11.2 % (ref 2–12)
NEUTROPHILS # BLD AUTO: 3.2 /CMM (ref 1.8–8.9)
NEUTROPHILS NFR BLD AUTO: 61.8 % (ref 43–81)
PHOSPHATE SERPL-MCNC: 2.1 MG/DL (ref 2.5–4.9)
PLATELET # BLD AUTO: 52 /CMM (ref 150–450)
POTASSIUM SERPL-SCNC: 3.1 MMOL/L (ref 3.5–5.1)
POTASSIUM SERPL-SCNC: 3.4 MMOL/L (ref 3.5–5.1)
PROT SERPL-MCNC: 5.8 G/DL (ref 6.4–8.2)
RBC # BLD AUTO: 3.73 MIL/UL (ref 4.5–6)
SODIUM SERPL-SCNC: 134 MMOL/L (ref 136–145)
SODIUM SERPL-SCNC: 136 MMOL/L (ref 136–145)
WBC NRBC COR # BLD AUTO: 5.2 K/UL (ref 4.3–11)

## 2020-01-02 RX ADMIN — SIMVASTATIN SCH MG: 20 TABLET, FILM COATED ORAL at 21:03

## 2020-01-02 RX ADMIN — DEXTROSE MONOHYDRATE SCH MLS/HR: 50 INJECTION, SOLUTION INTRAVENOUS at 12:15

## 2020-01-02 RX ADMIN — ACETAMINOPHEN PRN MG: 325 TABLET ORAL at 13:02

## 2020-01-02 RX ADMIN — DEXTROSE MONOHYDRATE SCH MLS/HR: 50 INJECTION, SOLUTION INTRAVENOUS at 16:21

## 2020-01-02 RX ADMIN — SODIUM CHLORIDE PRN MLS/HR: 9 INJECTION, SOLUTION INTRAVENOUS at 08:15

## 2020-01-02 RX ADMIN — Medication SCH MG: at 16:21

## 2020-01-02 RX ADMIN — MEROPENEM SCH MLS/HR: 1 INJECTION INTRAVENOUS at 08:15

## 2020-01-02 RX ADMIN — Medication SCH MG: at 08:56

## 2020-01-02 RX ADMIN — DEXTROSE MONOHYDRATE SCH MLS/HR: 50 INJECTION, SOLUTION INTRAVENOUS at 17:20

## 2020-01-02 RX ADMIN — OSELTAMIVIR PHOSPHATE SCH MG: 75 CAPSULE ORAL at 08:56

## 2020-01-02 RX ADMIN — DEXTROSE MONOHYDRATE PRN MG: 50 INJECTION, SOLUTION INTRAVENOUS at 09:06

## 2020-01-02 RX ADMIN — FAMOTIDINE SCH MG: 20 TABLET, FILM COATED ORAL at 08:56

## 2020-01-02 RX ADMIN — DEXTROSE MONOHYDRATE SCH MLS/HR: 50 INJECTION, SOLUTION INTRAVENOUS at 15:18

## 2020-01-02 RX ADMIN — METOPROLOL SUCCINATE SCH MG: 50 TABLET, EXTENDED RELEASE ORAL at 08:58

## 2020-01-02 NOTE — NUR
MS RN CLOSING NOTE



PT AWAKE IN BED, ALERT AND ORIENTED X 4, ON ROOM AIR, SATURATING WELL, RESPIRATIONS EVEN AND 
UNLABORED, NO SIGNS OF RESPIRATORY DISTRESS NOTED. IV SITE ON RIGHT AC G20 INTACT, PATENT, 
WITH HEP LOCK IN PLACE. PROVIDED SAFETY AND COMFORT TO PT THROUGHOUT SHIFT, ALL DUE MEDS 
GIVEN. BED IN LOW POSITION, LOCKED, CALL LIGHT WITHIN REACH, FAMILY BY BEDSIDE. CONSENT 
SIGNED FOR BRENDA SCHEDULED FOR 7AM 1/3/2020. ENDORSED TO [facility] SHIFT NURSE.

## 2020-01-02 NOTE — NUR
MS/RN OPENING NOTE



RECEIVED PT AWAKE IN BED WITH FAMILY AT BEDSIDE, ON ROOM AIR, SATURATING WELL, RESPIRATIONS 
EVEN AND UNLABORED, NO SIGNS OF RESPIRATORY DISTRESS NOTED. IV SITE ON RIGHT AC G20 INTACT, 
PATENT SL. WILL BE NPO STARTING MIDNIGHT. NO SIGNS OF INFILTRATION NOTED. SAFETY MEASURES 
ARE IN PLACE. BED IN LOW POSITION, LOCKED, CALL LIGHT WITHIN REACH. WILL CONTINUE MONITORING 
PT ACCORDINGLY.

## 2020-01-02 NOTE — NUR
MS RN OPENING NOTE



RECEIVED REPORT FROM [facility] SHIFT NURSE. PT ASLEEP IN BED, ON ROOM AIR, SATURATING WELL, 
RESPIRATIONS EVEN AND UNLABORED, NO SIGNS OF RESPIRATORY DISTRESS NOTED. IV SITE ON RIGHT AC 
G20 INTACT, PATENT, NS INFUSING AT 100CC/HR, NO SIGNS OF INFILTRATION NOTED. BED IN LOW 
POSITION, LOCKED, CALL LIGHT WITHIN REACH, FAMILY BY BEDSIDE.

## 2020-01-03 VITALS — SYSTOLIC BLOOD PRESSURE: 125 MMHG | DIASTOLIC BLOOD PRESSURE: 62 MMHG

## 2020-01-03 VITALS — SYSTOLIC BLOOD PRESSURE: 149 MMHG | DIASTOLIC BLOOD PRESSURE: 78 MMHG

## 2020-01-03 VITALS — SYSTOLIC BLOOD PRESSURE: 148 MMHG | DIASTOLIC BLOOD PRESSURE: 76 MMHG

## 2020-01-03 VITALS — DIASTOLIC BLOOD PRESSURE: 61 MMHG | SYSTOLIC BLOOD PRESSURE: 122 MMHG

## 2020-01-03 VITALS — DIASTOLIC BLOOD PRESSURE: 64 MMHG | SYSTOLIC BLOOD PRESSURE: 121 MMHG

## 2020-01-03 VITALS — SYSTOLIC BLOOD PRESSURE: 126 MMHG | DIASTOLIC BLOOD PRESSURE: 66 MMHG

## 2020-01-03 VITALS — SYSTOLIC BLOOD PRESSURE: 119 MMHG | DIASTOLIC BLOOD PRESSURE: 62 MMHG

## 2020-01-03 VITALS — SYSTOLIC BLOOD PRESSURE: 117 MMHG | DIASTOLIC BLOOD PRESSURE: 61 MMHG

## 2020-01-03 VITALS — SYSTOLIC BLOOD PRESSURE: 139 MMHG | DIASTOLIC BLOOD PRESSURE: 75 MMHG

## 2020-01-03 VITALS — SYSTOLIC BLOOD PRESSURE: 147 MMHG | DIASTOLIC BLOOD PRESSURE: 79 MMHG

## 2020-01-03 VITALS — DIASTOLIC BLOOD PRESSURE: 88 MMHG | SYSTOLIC BLOOD PRESSURE: 137 MMHG

## 2020-01-03 VITALS — SYSTOLIC BLOOD PRESSURE: 137 MMHG | DIASTOLIC BLOOD PRESSURE: 70 MMHG

## 2020-01-03 VITALS — SYSTOLIC BLOOD PRESSURE: 131 MMHG | DIASTOLIC BLOOD PRESSURE: 74 MMHG

## 2020-01-03 VITALS — SYSTOLIC BLOOD PRESSURE: 123 MMHG | DIASTOLIC BLOOD PRESSURE: 66 MMHG

## 2020-01-03 VITALS — DIASTOLIC BLOOD PRESSURE: 82 MMHG | SYSTOLIC BLOOD PRESSURE: 131 MMHG

## 2020-01-03 VITALS — DIASTOLIC BLOOD PRESSURE: 73 MMHG | SYSTOLIC BLOOD PRESSURE: 146 MMHG

## 2020-01-03 VITALS — SYSTOLIC BLOOD PRESSURE: 147 MMHG | DIASTOLIC BLOOD PRESSURE: 78 MMHG

## 2020-01-03 VITALS — DIASTOLIC BLOOD PRESSURE: 75 MMHG | SYSTOLIC BLOOD PRESSURE: 139 MMHG

## 2020-01-03 VITALS — SYSTOLIC BLOOD PRESSURE: 128 MMHG | DIASTOLIC BLOOD PRESSURE: 71 MMHG

## 2020-01-03 VITALS — SYSTOLIC BLOOD PRESSURE: 119 MMHG | DIASTOLIC BLOOD PRESSURE: 63 MMHG

## 2020-01-03 VITALS — SYSTOLIC BLOOD PRESSURE: 131 MMHG | DIASTOLIC BLOOD PRESSURE: 82 MMHG

## 2020-01-03 LAB
ALBUMIN SERPL ELPH-MCNC: 2.1 G/DL (ref 2.9–4.4)
ALBUMIN/GLOB SERPL: 0.7 {RATIO} (ref 0.7–1.7)
ALPHA1 GLOB SERPL ELPH-MCNC: 0.4 G/DL (ref 0–0.4)
ALPHA2 GLOB SERPL ELPH-MCNC: 0.7 G/DL (ref 0.4–1)
B-GLOBULIN SERPL ELPH-MCNC: 1 G/DL (ref 0.7–1.3)
BASOPHILS # BLD AUTO: 0.1 /CMM (ref 0–0.2)
BASOPHILS NFR BLD AUTO: 1.5 % (ref 0–2)
BUN SERPL-MCNC: 27 MG/DL (ref 7–18)
CALCIUM SERPL-MCNC: 7.6 MG/DL (ref 8.5–10.1)
CHLORIDE SERPL-SCNC: 103 MMOL/L (ref 98–107)
CO2 SERPL-SCNC: 24 MMOL/L (ref 21–32)
CREAT SERPL-MCNC: 1.3 MG/DL (ref 0.6–1.3)
D DIMER PPP FEU-MCNC: 1.87 MG/L(FEU (ref 0.17–0.5)
EOSINOPHIL NFR BLD AUTO: 1.9 % (ref 0–6)
EOSINOPHIL NFR BLD MANUAL: 2 % (ref 0–4)
FERRITIN SERPL-MCNC: 2341 NG/ML (ref 8–388)
GAMMA GLOB SERPL ELPH-MCNC: 0.8 G/DL (ref 0.4–1.8)
GLOBULIN SER CALC-MCNC: 2.9 G/DL (ref 2.2–3.9)
GLUCOSE SERPL-MCNC: 136 MG/DL (ref 74–106)
HCT VFR BLD AUTO: 30 % (ref 39–51)
HGB BLD-MCNC: 10.1 G/DL (ref 13.5–17.5)
IRON SERPL-MCNC: 25 UG/DL (ref 50–175)
LYMPHOCYTES NFR BLD AUTO: 1.4 /CMM (ref 0.8–4.8)
LYMPHOCYTES NFR BLD AUTO: 23.8 % (ref 20–44)
LYMPHOCYTES NFR BLD MANUAL: 26 % (ref 16–48)
MCHC RBC AUTO-ENTMCNC: 33 G/DL (ref 31–36)
MCV RBC AUTO: 84 FL (ref 80–96)
MONOCYTES NFR BLD AUTO: 1.4 /CMM (ref 0.1–1.3)
MONOCYTES NFR BLD AUTO: 23.9 % (ref 2–12)
MONOCYTES NFR BLD MANUAL: 15 % (ref 0–11)
NEUTROPHILS # BLD AUTO: 2.9 /CMM (ref 1.8–8.9)
NEUTROPHILS NFR BLD AUTO: 48.9 % (ref 43–81)
NEUTS SEG NFR BLD MANUAL: 57 % (ref 42–76)
PHOSPHATE SERPL-MCNC: 2.4 MG/DL (ref 2.5–4.9)
PLATELET # BLD AUTO: 70 /CMM (ref 150–450)
PLATELET # BLD AUTO: 70 /CMM (ref 150–450)
POTASSIUM SERPL-SCNC: 3.4 MMOL/L (ref 3.5–5.1)
PTH-INTACT SERPL-MCNC: 46 PG/ML (ref 15–65)
RBC # BLD AUTO: 3.61 MIL/UL (ref 4.5–6)
SODIUM SERPL-SCNC: 137 MMOL/L (ref 136–145)
TIBC SERPL-MCNC: 153 UG/DL (ref 250–450)
TSH SERPL DL<=0.005 MIU/L-ACNC: 1.03 UIU/ML (ref 0.36–3.74)
VANCOMYCIN TROUGH SERPL-MCNC: 10 UG/ML (ref 12–20)
WBC NRBC COR # BLD AUTO: 6 K/UL (ref 4.3–11)

## 2020-01-03 PROCEDURE — B547ZZA ULTRASONOGRAPHY OF LEFT SUBCLAVIAN VEIN, GUIDANCE: ICD-10-PCS | Performed by: INTERNAL MEDICINE

## 2020-01-03 PROCEDURE — 05H633Z INSERTION OF INFUSION DEVICE INTO LEFT SUBCLAVIAN VEIN, PERCUTANEOUS APPROACH: ICD-10-PCS | Performed by: INTERNAL MEDICINE

## 2020-01-03 RX ADMIN — Medication SCH MG: at 08:34

## 2020-01-03 RX ADMIN — OSELTAMIVIR PHOSPHATE SCH MG: 75 CAPSULE ORAL at 08:36

## 2020-01-03 RX ADMIN — FAMOTIDINE SCH MG: 20 TABLET, FILM COATED ORAL at 08:34

## 2020-01-03 RX ADMIN — DEXTROSE MONOHYDRATE SCH MLS/HR: 50 INJECTION, SOLUTION INTRAVENOUS at 21:24

## 2020-01-03 RX ADMIN — METOPROLOL SUCCINATE SCH MG: 50 TABLET, EXTENDED RELEASE ORAL at 08:36

## 2020-01-03 RX ADMIN — Medication SCH MG: at 09:31

## 2020-01-03 RX ADMIN — DEXTROSE MONOHYDRATE SCH MLS/HR: 50 INJECTION, SOLUTION INTRAVENOUS at 15:12

## 2020-01-03 RX ADMIN — SIMVASTATIN SCH MG: 20 TABLET, FILM COATED ORAL at 21:24

## 2020-01-03 RX ADMIN — DEXTROSE MONOHYDRATE SCH MLS/HR: 50 INJECTION, SOLUTION INTRAVENOUS at 08:47

## 2020-01-03 RX ADMIN — DEXTROSE MONOHYDRATE SCH MLS/HR: 50 INJECTION, SOLUTION INTRAVENOUS at 11:22

## 2020-01-03 RX ADMIN — Medication SCH MG: at 16:14

## 2020-01-03 NOTE — NUR
TELE RN NOTE 

 RECEIVED PATIENT FROM ICU , ALERT ORIENTED ,ON RA  ,NO SOB NOTED ,  RT AC AND RT HAND HL 
INTACT AND FLUSHED WELL ,BED IN LOWEST AND LOCKED POSITION , CALL LIGHT WITHIN REACH , PLAN 
OF CARE DISCUSSED WITH PATIENT , PLACED ON TELE SR  ON VANCO  ATB INFUSING AT THIS TIME ,NO 
C\O CHEST PAIN, WILL MONITOR

## 2020-01-03 NOTE — NUR
ICU/RN: BRENDA COMPLETE, NO EVIDENCE OF ENDOCARDITIS NOTED. PER  PT CAN BE 
RECOVERED AND RETURNED BACK TO TELE FLOOR. VSS, NO DISTRESS NOTED. WILL CONTINUE TO MONITOR.

## 2020-01-03 NOTE — NUR
MS/RN CLOSING NOTES:



PATIENT TRANSFERRED TO ICU IN STABLE CONDITION VIA BED. REPORT GIVEN TO SHIRLEY FLORENTINO, ALL 
CONSENTS FOR THE PROCEDURE SIGNED. VITAL SIGNS STABLE. BP:137/70. TEMP 97.8, HR 77, RR 20, 
O2 SAT 95%. ON ROOM AIR, TOLERATING WELL. NO SOB NOTED, NO S/S OF ACUTE RESPIRATORY 
DISTRESS, NO COMPLAINS OF PAIN AT THIS TIME. WILL ENDORSE TO SHIRLEY FOR KERI.

## 2020-01-03 NOTE — NUR
RN OPENING NOTES: RECEIVED PATIENT RESTING IN BED, A/O X4. WIFE AT THE BEDSIDE. NO SOB 
NOTED. NO COMPLAIN OF PAIN. CALL LIGHT WITHIN REACH. BED IN LOWEST AND LOCKED POSITION.

## 2020-01-03 NOTE — NUR
TELE RN NOTE 

PER DR SHAQUILLE TALBOT TO PLACE PICC LINE FOR LONG ATB INFUSION AT HOME , CONSENT SIGNED BY 
PATIENT

## 2020-01-03 NOTE — NUR
TELE RN NOTE

 DAYO FROM PICC LINE INSERTED PICC LINE ON LT UPPER ARM KEEP CLEAN DRY , ALL NEEDS ATTENDED

## 2020-01-03 NOTE — NUR
ICU/RN-RECEIVED PT. FROM [facility] BY BED, ACCOMPANIED BY STAFF. PT.  IS SCHEDULED FOR BRENDA TODAY 
BY DR. LYNN AT 0700. PT. IS AWAKE, ALERT, EXPRESSIVE OF NEEDS. [place]an SPEAKING W/  
LIMITED ENGLISH. EKG SR W/ HR-77. BP-131/74. DENIES PAIN OR DISCOMFORT. NEW IV SITE RIGHT 
HAND G. 20 STARTED X1  ATTEMPT. PT. NPO SINCE  MIDNITE.  DENIES PAIN. ON ROOM AIR, 
SATURATING 96%. NO S/S OF DISTRESS. AFEBRILE. WILL CONTINUE TO MONITOR PER PROTOCOL.

## 2020-01-03 NOTE — NUR
ICU/RN INITIAL NOTES,AM



RECEIVED BEDSIDE REPORT FROM NIGHT NURSE.  AT BEDSIDE WITH ANESTHESIOLOGIST AND 
ECHO TECH FOR BRENDA. CONSENT IN CHART. PT ON TELE, SINUS. VSS. PIVS PATENT AND INTACT, NO S/S 
OF INFECTION OR INFILTRATION NOTED. LEFT LEG CELLULITIS NOTED, SLIGHT REDNESS, NO WARMTH TO 
TOUCH, NO SWELLING. ALL NEEDS WILL BE ATTENDED TO, SAFETY MEASURES TAKEN, BED IN LOW 
POSITION, SIDE RAILS UP, CALL LIGHT WITHIN REACH. WILL CONTINUE TO MONITOR AND CARE.

## 2020-01-03 NOTE — NUR
tele rn note

 called to pharmacy notified that Ancef not available yet for 1400 stated that will bring 
soon

## 2020-01-03 NOTE — NUR
ICU/RN: BEDSIDE REPORT ENDORSED TO ROSALIND AND PT TRANSFERRED TO ROOM 102 WITH ACLS 
GUIDELINES S/P BRENDA. ALL NEEDS ATTENDED TO. PT AAOX4, SINUS ON TELE. WIFE AT BEDSIDE. 
ENDORSED REPORT FOR KERI.

## 2020-01-04 VITALS — SYSTOLIC BLOOD PRESSURE: 145 MMHG | DIASTOLIC BLOOD PRESSURE: 67 MMHG

## 2020-01-04 VITALS — DIASTOLIC BLOOD PRESSURE: 69 MMHG | SYSTOLIC BLOOD PRESSURE: 145 MMHG

## 2020-01-04 VITALS — DIASTOLIC BLOOD PRESSURE: 68 MMHG | SYSTOLIC BLOOD PRESSURE: 132 MMHG

## 2020-01-04 VITALS — SYSTOLIC BLOOD PRESSURE: 132 MMHG | DIASTOLIC BLOOD PRESSURE: 62 MMHG

## 2020-01-04 VITALS — DIASTOLIC BLOOD PRESSURE: 68 MMHG | SYSTOLIC BLOOD PRESSURE: 142 MMHG

## 2020-01-04 LAB
BASOPHILS # BLD AUTO: 0.2 /CMM (ref 0–0.2)
BASOPHILS NFR BLD AUTO: 2.4 % (ref 0–2)
BUN SERPL-MCNC: 36 MG/DL (ref 7–18)
CALCIUM SERPL-MCNC: 7 MG/DL (ref 8.5–10.1)
CHLORIDE SERPL-SCNC: 103 MMOL/L (ref 98–107)
CO2 SERPL-SCNC: 22 MMOL/L (ref 21–32)
CREAT SERPL-MCNC: 2.4 MG/DL (ref 0.6–1.3)
EOSINOPHIL NFR BLD AUTO: 1.7 % (ref 0–6)
GLUCOSE SERPL-MCNC: 113 MG/DL (ref 74–106)
HCT VFR BLD AUTO: 26 % (ref 39–51)
HGB BLD-MCNC: 8.7 G/DL (ref 13.5–17.5)
IGA SERPL-MCNC: 165 MG/DL (ref 61–437)
IGM SERPL-MCNC: 37 MG/DL (ref 20–172)
LYMPHOCYTES NFR BLD AUTO: 0.9 /CMM (ref 0.8–4.8)
LYMPHOCYTES NFR BLD AUTO: 14.6 % (ref 20–44)
LYMPHOCYTES NFR BLD MANUAL: 23 % (ref 16–48)
MAGNESIUM SERPL-MCNC: 1.8 MG/DL (ref 1.8–2.4)
MCHC RBC AUTO-ENTMCNC: 33 G/DL (ref 31–36)
MCV RBC AUTO: 84 FL (ref 80–96)
MONOCYTES NFR BLD AUTO: 1 /CMM (ref 0.1–1.3)
MONOCYTES NFR BLD AUTO: 16.7 % (ref 2–12)
MONOCYTES NFR BLD MANUAL: 10 % (ref 0–11)
NEUTROPHILS # BLD AUTO: 4 /CMM (ref 1.8–8.9)
NEUTROPHILS NFR BLD AUTO: 64.6 % (ref 43–81)
NEUTS BAND NFR BLD MANUAL: 2 % (ref 0–5)
NEUTS SEG NFR BLD MANUAL: 65 % (ref 42–76)
PHOSPHATE SERPL-MCNC: 3.9 MG/DL (ref 2.5–4.9)
PLATELET # BLD AUTO: 78 /CMM (ref 150–450)
POTASSIUM SERPL-SCNC: 3.4 MMOL/L (ref 3.5–5.1)
RBC # BLD AUTO: 3.12 MIL/UL (ref 4.5–6)
SODIUM SERPL-SCNC: 135 MMOL/L (ref 136–145)
WBC NRBC COR # BLD AUTO: 6.2 K/UL (ref 4.3–11)

## 2020-01-04 RX ADMIN — DEXTROSE MONOHYDRATE SCH MLS/HR: 50 INJECTION, SOLUTION INTRAVENOUS at 13:01

## 2020-01-04 RX ADMIN — Medication SCH MG: at 08:25

## 2020-01-04 RX ADMIN — Medication SCH MG: at 08:24

## 2020-01-04 RX ADMIN — FAMOTIDINE SCH MG: 20 TABLET, FILM COATED ORAL at 08:25

## 2020-01-04 RX ADMIN — Medication SCH MG: at 08:35

## 2020-01-04 RX ADMIN — Medication SCH MG: at 16:25

## 2020-01-04 RX ADMIN — METOPROLOL SUCCINATE SCH MG: 50 TABLET, EXTENDED RELEASE ORAL at 08:31

## 2020-01-04 RX ADMIN — DEXTROSE MONOHYDRATE SCH MLS/HR: 50 INJECTION, SOLUTION INTRAVENOUS at 05:01

## 2020-01-04 RX ADMIN — OSELTAMIVIR PHOSPHATE SCH MG: 75 CAPSULE ORAL at 08:25

## 2020-01-04 RX ADMIN — DEXTROSE MONOHYDRATE SCH MLS/HR: 50 INJECTION, SOLUTION INTRAVENOUS at 18:56

## 2020-01-04 NOTE — NUR
RN CLOSING NOTE 



PATIENT WILL HAVE HIS LAST DOSE OF ANCEF AT 1900, OK PER DR VIRK TO GIVE 2HOURS EARLY. 
WILL BE DC AFTER THE DOSE. WIFE AT BEDSIDE. ALL NEEDS MET. ALL MEDS GIVEN, PATIENT REFUSED 
TO TAKE COLCHICINE. WILL ENDORSE TO NOC SHIFT FOR KERI IF PATIENT STILL HERE AFTER THE ANBX 
DOSE

## 2020-01-04 NOTE — NUR
RN CLOSING NOTES: PATIENT IS SITTING AT THE EDGE OF THE BED. NO SOB NOTED. NO COMPLAIN OF 
PAIN. WIFE AT THE BEDSIDE. NO ACUTE EVENTS OVERNIGHT. VITALS STABLE. AFEBRILE. ANTIBIOTICS 
GIVEN AS SCHEDULED. AMBULATORY. CALL LIGHT WITHIN REACH. BED IN LOWEST AND LOCKED POSITION. 
NO COMPLAIN OF CHEST PAIN.

## 2020-01-04 NOTE — NUR
RN INITIAL NOTE 



PATIENT IN BED, AWAKE AND ALERT. WIFE AT BEDSIDE. NO COMPLAINS OF ANY PAIN NOR SOB. BED 
LOCKED AND IN LOWEST POSITION. CALL LIGHT WITHIN REACH, WILL CONTINUE TO MONITOR

## 2020-01-04 NOTE — NUR
RN NOTE 



PATIENT HAS A DC ORDER FROM DR VIRK. PATIENT WILL HAVE HOME HEALTH AT HOME BUT 
UNFORTUNATELY, HH ASSIGNED WON'T BE ABLE TO PROVIDE THE ANTIBIOTICS FOR TONIGHT'S DOSE. PER 
, PATIENT WILL HAVE THE 1300 DOSE AND 1900 DOSE TONIGHT. 1900 DOSE WAS SUPPOSED 
TO BE 2100, BUT DR VIRK APPROVED TO GIVE IT AT 1900 SO THE PATIENT CAN GO HOME AS 
EARLY AS POSSIBLE

## 2020-01-04 NOTE — NUR
RN NOTE

ANCEF IV COMPLETED. IV ON RIGHT WRIST REMOVED. TELE BOX REMOVED. DISCHARGE PAPERWORK SIGNED. 
FAMILY AT BEDSIDE. ALL BELONGINGS WITH FAMILY. DISCHARGED OUT OF FACILITY IN ACCOMPANIED BY 
FAMILY IN STABLE CONDITION.

## 2020-01-05 LAB
HIV1 RNA # SERPL NAA+PROBE: <20 COPIES/ML
HIV1 RNA SERPL NAA+PROBE-LOG#: (no result) {LOG_COPIES}/ML